# Patient Record
(demographics unavailable — no encounter records)

---

## 2025-03-28 NOTE — HISTORY OF PRESENT ILLNESS
[de-identified] : \par  History of Present Illness\par  45-year-old gentleman retired [place]  May 2021 after 16 1/2 years of service had several injuries while working in particular in 2018 injured his neck, after  conservative treatment he had surgery at  [facility] 3 level cervical fusion by  October 29th subsequently was retired still having symptoms in the neck with tingling down the right arm to wrist also low back pain is present most of the time "sore" he has had some injuries to the right shoulder and left wrist in the past with some conservative treatment no medication right now did see pain management who ordered some diagnostic studies he had done some therapy then moved to [place] and has not restarted with the therapy yet his endurance 20 minutes sitting 20-30 minutes standing or walking negative cough and sneeze effect no problem bowel or bladder function does have some difficulty sleeping due to pain

## 2025-03-28 NOTE — HISTORY OF PRESENT ILLNESS
[de-identified] : \par  History of Present Illness\par  45-year-old gentleman retired [place]  May 2021 after 16 1/2 years of service had several injuries while working in particular in 2018 injured his neck, after  conservative treatment he had surgery at  [facility] 3 level cervical fusion by  October 29th subsequently was retired still having symptoms in the neck with tingling down the right arm to wrist also low back pain is present most of the time "sore" he has had some injuries to the right shoulder and left wrist in the past with some conservative treatment no medication right now did see pain management who ordered some diagnostic studies he had done some therapy then moved to [place] and has not restarted with the therapy yet his endurance 20 minutes sitting 20-30 minutes standing or walking negative cough and sneeze effect no problem bowel or bladder function does have some difficulty sleeping due to pain

## 2025-03-28 NOTE — IMAGING
[de-identified] :  right shoulder guarded motion in all planes tested impingement positive Turner sign some spasm pain on forced adduction tenderness in the AC joint biceps intact negative Medina's Speed test Left shoulder tender limited motion impingement biceps intact  X-ray left shoulder inferior humeral head spur  Right wrist decreased  negative Tinel and Phalen's mild limits in dorsiflexion and palmar flexion pulse good sensation full  diffuse tenderness on the dorsal side  left wrist decreased  pain with dorsiflexion palmar flexion  Cervical examination healed incisions some spasm both sides of midline limited rotation and flexion extension voice is strong no difficulty swallowing bilateral trapezius spasm right 1 left some dysesthesia down the right arm to hand  Lumbar normal posture no scoliosis diffuse spasm limited flexion extension pain and extension tightness in dorsal lumbar fascia and hamstrings negative straight leg negative bowstring bilaterally hip motion is full bilaterally normal gait   MRI left wrist 09/26/2022:  Edema moderate large TFCC tear  MRI LS spine 09/26/2022:  Multiple level lumbar spondylosis with multiple level disc bulges  MRI cervical spine 09/26/2022:  Several disc bulges disc protrusion C4-5 with mild moderate central canal stenosis

## 2025-03-28 NOTE — IMAGING
[de-identified] :  right shoulder guarded motion in all planes tested impingement positive Turner sign some spasm pain on forced adduction tenderness in the AC joint biceps intact negative Goodhue's Speed test Left shoulder tender limited motion impingement biceps intact  X-ray left shoulder inferior humeral head spur  Right wrist decreased  negative Tinel and Phalen's mild limits in dorsiflexion and palmar flexion pulse good sensation full  diffuse tenderness on the dorsal side  left wrist decreased  pain with dorsiflexion palmar flexion  Cervical examination healed incisions some spasm both sides of midline limited rotation and flexion extension voice is strong no difficulty swallowing bilateral trapezius spasm right 1 left some dysesthesia down the right arm to hand  Lumbar normal posture no scoliosis diffuse spasm limited flexion extension pain and extension tightness in dorsal lumbar fascia and hamstrings negative straight leg negative bowstring bilaterally hip motion is full bilaterally normal gait   MRI left wrist 09/26/2022:  Edema moderate large TFCC tear  MRI LS spine 09/26/2022:  Multiple level lumbar spondylosis with multiple level disc bulges  MRI cervical spine 09/26/2022:  Several disc bulges disc protrusion C4-5 with mild moderate central canal stenosis

## 2025-03-28 NOTE — REASON FOR VISIT
[FreeTextEntry2] : neck and left shoulder and lower back pain Still seeing Dr. Franco had a shoulder cortisone injection last month third 1 he has had is in therapy

## 2025-03-28 NOTE — ASSESSMENT
[FreeTextEntry1] : I believe most of the pain in his right shoulder is from his neck his pain management doctor  he continues with his appropriate medication tizanidine and meloxicam Recommended MRI left shoulder rule out cuff tear we will do some left shoulder therapy I will see him in a couple months Hide right shoulder MRI 2023

## 2025-07-01 NOTE — HISTORY OF PRESENT ILLNESS
[de-identified] : \par  History of Present Illness\par  45-year-old gentleman retired [place]  May 2021 after 16 1/2 years of service had several injuries while working in particular in 2018 injured his neck, after  conservative treatment he had surgery at  [facility] 3 level cervical fusion by  October 29th subsequently was retired still having symptoms in the neck with tingling down the right arm to wrist also low back pain is present most of the time "sore" he has had some injuries to the right shoulder and left wrist in the past with some conservative treatment no medication right now did see pain management who ordered some diagnostic studies he had done some therapy then moved to [place] and has not restarted with the therapy yet his endurance 20 minutes sitting 20-30 minutes standing or walking negative cough and sneeze effect no problem bowel or bladder function does have some difficulty sleeping due to pain

## 2025-07-01 NOTE — ASSESSMENT
[FreeTextEntry1] : I believe most of the pain in his right shoulder is from his neck his pain management doctor  he continues with his appropriate medication tizanidine and meloxicam  MRI left shoulder ruled out cuff tear we will do some left shoulder therapy I will see him in a couple months Had right shoulder MRI 2023 Did not see much value to a PRP injection to the left shoulder

## 2025-07-01 NOTE — IMAGING
[de-identified] :  right shoulder guarded motion in all planes tested impingement positive Turner sign some spasm pain on forced adduction tenderness in the AC joint biceps intact negative Gaines's Speed test Left shoulder tender limited motion impingement biceps intact  X-ray left shoulder inferior humeral head spur  Right wrist decreased  negative Tinel and Phalen's mild limits in dorsiflexion and palmar flexion pulse good sensation full  diffuse tenderness on the dorsal side  left wrist decreased  pain with dorsiflexion palmar flexion  Cervical examination healed incisions some spasm both sides of midline limited rotation and flexion extension voice is strong no difficulty swallowing bilateral trapezius spasm right 1 left some dysesthesia down the right arm to hand  Lumbar normal posture no scoliosis diffuse spasm limited flexion extension pain and extension tightness in dorsal lumbar fascia and hamstrings negative straight leg negative bowstring bilaterally hip motion is full bilaterally normal gait   MRI left wrist 09/26/2022:  Edema moderate large TFCC tear  MRI LS spine 09/26/2022:  Multiple level lumbar spondylosis with multiple level disc bulges  MRI cervical spine 09/26/2022:  Several disc bulges disc protrusion C4-5 with mild moderate central canal stenosis  ***MRI left shoulder 3/31/2025: Mild glenohumeral and AC joint arthritis particularly inferior on the glenoid mild edema glenohumeral ligament suggestive sprain and/or capsulitis probable posterior labral tear with cyst

## 2025-07-01 NOTE — IMAGING
[de-identified] :  right shoulder guarded motion in all planes tested impingement positive Turner sign some spasm pain on forced adduction tenderness in the AC joint biceps intact negative Wheatland's Speed test Left shoulder tender limited motion impingement biceps intact  X-ray left shoulder inferior humeral head spur  Right wrist decreased  negative Tinel and Phalen's mild limits in dorsiflexion and palmar flexion pulse good sensation full  diffuse tenderness on the dorsal side  left wrist decreased  pain with dorsiflexion palmar flexion  Cervical examination healed incisions some spasm both sides of midline limited rotation and flexion extension voice is strong no difficulty swallowing bilateral trapezius spasm right 1 left some dysesthesia down the right arm to hand  Lumbar normal posture no scoliosis diffuse spasm limited flexion extension pain and extension tightness in dorsal lumbar fascia and hamstrings negative straight leg negative bowstring bilaterally hip motion is full bilaterally normal gait   MRI left wrist 09/26/2022:  Edema moderate large TFCC tear  MRI LS spine 09/26/2022:  Multiple level lumbar spondylosis with multiple level disc bulges  MRI cervical spine 09/26/2022:  Several disc bulges disc protrusion C4-5 with mild moderate central canal stenosis  ***MRI left shoulder 3/31/2025: Mild glenohumeral and AC joint arthritis particularly inferior on the glenoid mild edema glenohumeral ligament suggestive sprain and/or capsulitis probable posterior labral tear with cyst

## 2025-07-01 NOTE — HISTORY OF PRESENT ILLNESS
[de-identified] : \par  History of Present Illness\par  45-year-old gentleman retired [place]  May 2021 after 16 1/2 years of service had several injuries while working in particular in 2018 injured his neck, after  conservative treatment he had surgery at  [facility] 3 level cervical fusion by  October 29th subsequently was retired still having symptoms in the neck with tingling down the right arm to wrist also low back pain is present most of the time "sore" he has had some injuries to the right shoulder and left wrist in the past with some conservative treatment no medication right now did see pain management who ordered some diagnostic studies he had done some therapy then moved to [place] and has not restarted with the therapy yet his endurance 20 minutes sitting 20-30 minutes standing or walking negative cough and sneeze effect no problem bowel or bladder function does have some difficulty sleeping due to pain

## 2025-07-01 NOTE — REASON FOR VISIT
[FreeTextEntry2] : left shoulder backand neck pain therapy has not been very helpful diclofenac helps did get MRI left shoulder 3/31/2025 asking questions about PRP to the shoulder